# Patient Record
Sex: FEMALE | Race: WHITE | NOT HISPANIC OR LATINO
[De-identification: names, ages, dates, MRNs, and addresses within clinical notes are randomized per-mention and may not be internally consistent; named-entity substitution may affect disease eponyms.]

---

## 2020-09-29 ENCOUNTER — TRANSCRIPTION ENCOUNTER (OUTPATIENT)
Age: 30
End: 2020-09-29

## 2021-08-06 ENCOUNTER — EMERGENCY (EMERGENCY)
Facility: HOSPITAL | Age: 31
LOS: 1 days | Discharge: HOME | End: 2021-08-06
Attending: EMERGENCY MEDICINE | Admitting: EMERGENCY MEDICINE
Payer: COMMERCIAL

## 2021-08-06 VITALS
DIASTOLIC BLOOD PRESSURE: 76 MMHG | OXYGEN SATURATION: 97 % | HEART RATE: 76 BPM | HEIGHT: 64 IN | WEIGHT: 104.94 LBS | RESPIRATION RATE: 18 BRPM | SYSTOLIC BLOOD PRESSURE: 116 MMHG | TEMPERATURE: 98 F

## 2021-08-06 VITALS
SYSTOLIC BLOOD PRESSURE: 102 MMHG | DIASTOLIC BLOOD PRESSURE: 75 MMHG | RESPIRATION RATE: 16 BRPM | HEART RATE: 68 BPM | TEMPERATURE: 98 F | OXYGEN SATURATION: 98 %

## 2021-08-06 LAB
APPEARANCE UR: CLEAR — SIGNIFICANT CHANGE UP
BILIRUB UR-MCNC: NEGATIVE — SIGNIFICANT CHANGE UP
COLOR SPEC: SIGNIFICANT CHANGE UP
DIFF PNL FLD: NEGATIVE — SIGNIFICANT CHANGE UP
GLUCOSE UR QL: NEGATIVE MG/DL — SIGNIFICANT CHANGE UP
HCG UR QL: NEGATIVE — SIGNIFICANT CHANGE UP
KETONES UR-MCNC: NEGATIVE — SIGNIFICANT CHANGE UP
LEUKOCYTE ESTERASE UR-ACNC: NEGATIVE — SIGNIFICANT CHANGE UP
NITRITE UR-MCNC: NEGATIVE — SIGNIFICANT CHANGE UP
PH UR: 8 — SIGNIFICANT CHANGE UP (ref 5–8)
PROT UR-MCNC: NEGATIVE MG/DL — SIGNIFICANT CHANGE UP
SP GR SPEC: 1.01 — SIGNIFICANT CHANGE UP (ref 1.01–1.02)
UROBILINOGEN FLD QL: NEGATIVE MG/DL — SIGNIFICANT CHANGE UP

## 2021-08-06 PROCEDURE — 72100 X-RAY EXAM L-S SPINE 2/3 VWS: CPT

## 2021-08-06 PROCEDURE — 99284 EMERGENCY DEPT VISIT MOD MDM: CPT | Mod: 25

## 2021-08-06 PROCEDURE — 96375 TX/PRO/DX INJ NEW DRUG ADDON: CPT

## 2021-08-06 PROCEDURE — 96365 THER/PROPH/DIAG IV INF INIT: CPT

## 2021-08-06 PROCEDURE — 81025 URINE PREGNANCY TEST: CPT

## 2021-08-06 PROCEDURE — 99284 EMERGENCY DEPT VISIT MOD MDM: CPT

## 2021-08-06 PROCEDURE — 81003 URINALYSIS AUTO W/O SCOPE: CPT

## 2021-08-06 PROCEDURE — 72100 X-RAY EXAM L-S SPINE 2/3 VWS: CPT | Mod: 26

## 2021-08-06 RX ORDER — IBUPROFEN 200 MG
1 TABLET ORAL
Qty: 15 | Refills: 0
Start: 2021-08-06 | End: 2021-08-10

## 2021-08-06 RX ORDER — DEXAMETHASONE 0.5 MG/5ML
10 ELIXIR ORAL ONCE
Refills: 0 | Status: COMPLETED | OUTPATIENT
Start: 2021-08-06 | End: 2021-08-06

## 2021-08-06 RX ORDER — DIAZEPAM 5 MG
5 TABLET ORAL ONCE
Refills: 0 | Status: DISCONTINUED | OUTPATIENT
Start: 2021-08-06 | End: 2021-08-06

## 2021-08-06 RX ORDER — DIAZEPAM 5 MG
1 TABLET ORAL
Qty: 9 | Refills: 0
Start: 2021-08-06 | End: 2021-08-08

## 2021-08-06 RX ORDER — KETOROLAC TROMETHAMINE 30 MG/ML
30 SYRINGE (ML) INJECTION ONCE
Refills: 0 | Status: DISCONTINUED | OUTPATIENT
Start: 2021-08-06 | End: 2021-08-06

## 2021-08-06 RX ADMIN — Medication 30 MILLIGRAM(S): at 14:00

## 2021-08-06 RX ADMIN — Medication 30 MILLIGRAM(S): at 14:30

## 2021-08-06 RX ADMIN — Medication 5 MILLIGRAM(S): at 13:56

## 2021-08-06 RX ADMIN — Medication 10 MILLIGRAM(S): at 14:30

## 2021-08-06 RX ADMIN — Medication 102 MILLIGRAM(S): at 14:00

## 2021-08-06 NOTE — ED PROVIDER NOTE - OBJECTIVE STATEMENT
Pt is a 29 yo female with no pmhx c/o of left sided back pain shooting pain in buttocks down leg since this morning. Pt states pain started after she stretched denies any heavy lifting. Pt denies any numbness tingling saddle anesthesia bowel or bladder dysfunction. Pt took flexeril without relief. Pt states she has had back pain in past minor nothing compared to this.

## 2021-08-06 NOTE — ED PROVIDER NOTE - CARE PROVIDER_API CALL
Omari Back  ORTHOPAEDIC SURGERY  [address] [place] [address]  Phone: [phone]  Fax: [phone]  Follow Up Time:

## 2021-08-06 NOTE — ED PROVIDER NOTE - PATIENT PORTAL LINK FT
You can access the FollowMyHealth Patient Portal offered by [facility] by registering at the following website: [email or web address]. By joining [employer]’s FollowMyHealth portal, you will also be able to view your health information using other applications (apps) compatible with our system.

## 2021-08-06 NOTE — ED ADULT NURSE NOTE - OBJECTIVE STATEMENT
Complaining of left sided back pain radiating to left leg started this am, pt stated she went to the Gym but does not remember any injury. Pain started after reaching for something while at home.

## 2021-08-06 NOTE — ED PROVIDER NOTE - NSFOLLOWUPINSTRUCTIONS_ED_ALL_ED_FT
Follow up with orthopedics  return to er for any worsening symptoms weakness numbness tingling urinary bladder incontinence       Sciatica    WHAT YOU NEED TO KNOW:    Sciatica is a condition that causes pain along your sciatic nerve. The sciatic nerve runs from your spine through both sides of your buttocks. It then runs down the back of your thigh, into your lower leg and foot. Your sciatic nerve may be compressed, inflamed, irritated, or stretched.    DISCHARGE INSTRUCTIONS:    Medicines:   •NSAIDs: These medicines decrease swelling and pain. NSAIDs are available without a doctor's order. Ask your healthcare provider which medicine is right for you. Ask how much to take and when to take it. Take as directed. NSAIDs can cause stomach bleeding or kidney problems if not taken correctly.      •Acetaminophen: This medicine decreases pain. Acetaminophen is available without a doctor's order. Ask how much to take and when to take it. Follow directions. Acetaminophen can cause liver damage if not taken correctly.      •Muscle relaxers help decrease pain and muscle spasms.      •Take your medicine as directed. Contact your healthcare provider if you think your medicine is not helping or if you have side effects. Tell him of her if you are allergic to any medicine. Keep a list of the medicines, vitamins, and herbs you take. Include the amounts, and when and why you take them. Bring the list or the pill bottles to follow-up visits. Carry your medicine list with you in case of an emergency.      Follow up with your doctor as directed: Write down your questions so you remember to ask them during your visits.     Manage your symptoms:   •Activity: Decrease your activity. Do not lift heavy objects or twist your back for at least 6 weeks. Slowly return to your usual activity.      •Ice: Ice helps decrease swelling and pain. Ice may also help prevent tissue damage. Use an ice pack, or put crushed ice in a plastic bag. Cover it with a towel and place it on your low back or leg for 15 to 20 minutes every hour or as directed.      •Heat: Heat helps decrease pain and muscle spasms. Apply heat on the area for 20 to 30 minutes every 2 hours for as many days as directed.       •Physical therapy: You may need to see physical therapist to teach you exercises to help improve movement and strength, and to decrease pain. An occupational therapist teaches you skills to help with your daily activities.       •Use assistive devices if directed: You may need to wear back support, such as a back brace. You may need crutches, a cane, or a walker to decrease stress on your lower back and leg muscles. Ask your healthcare provider for more information about assistive devices and how to use them correctly.      Self-care:   •Avoid pressure on your back and legs: Do not lift heavy objects, or stand or sit for long periods of time.      •Lift objects safely: Keep your back straight and bend your knees when you  an object. Do not bend or twist your back when you lift.      •Maintain a healthy weight: Ask your healthcare provider how much you should weigh. Ask him to help you create a weight loss plan if you are overweight.       •Exercise: Ask your healthcare provider about the best stretching, warmup, and exercise plan for you.       Contact your healthcare provider if:   •You have pain in your lower back at night or when resting.      •You have pain in your lower back with numbness below the knee.      •You have weakness in one leg only.      •You have questions or concerns about your condition or care.      Return to the emergency department if:   •You have trouble holding back your urine or bowel movements.      •You have weakness in both legs.      •You have numbness in your groin or buttocks.

## 2021-08-06 NOTE — ED PROVIDER NOTE - ATTENDING CONTRIBUTION TO CARE
Pt is a 31 yo female who presents to the ED with a cc of left back pain with radiation down his leg. Pt with no significant past medical history. Pt reports that this morning she bent to pick something up and felt a pop in his lower back and has been experiencing left lower back/gluteal pain with radiation down her left leg since. Denies ext numbness or weakness, denies loss of bowel or bladder function. Pt has no known back issues. Reports increased pain with movement. Took flexeril at home with no improvement in pain. Denies fever, chills, N/V, CP, SOB, abd pain. Denies dysuria, hematuria. On exam pt sitting up in bed s/p medication appears mores comfortable. Is able to ambulate in the ED. NCAT, PERRL, EOMI, heart RR, lungs CTA, abd soft NT/ND. No Midline C/T/L TTP. TTP to left lower paraspinal muscle region, left gluteal region with no overlying skin changes, positive straight left raise on the left. NVI to bilateral lower ext. Pt presenting with s/s concerning for sciatica high suspicion for disc herniation. Will obtain x-ray and medicate for pain. Advised pt of need for follow up for outpatient MRI.

## 2022-04-08 ENCOUNTER — TRANSCRIPTION ENCOUNTER (OUTPATIENT)
Age: 32
End: 2022-04-08

## 2023-01-04 ENCOUNTER — NON-APPOINTMENT (OUTPATIENT)
Age: 33
End: 2023-01-04

## 2023-05-04 ENCOUNTER — APPOINTMENT (OUTPATIENT)
Dept: HUMAN REPRODUCTION | Facility: CLINIC | Age: 33
End: 2023-05-04
Payer: COMMERCIAL

## 2023-05-04 ENCOUNTER — TRANSCRIPTION ENCOUNTER (OUTPATIENT)
Age: 33
End: 2023-05-04

## 2023-05-04 PROCEDURE — 76830 TRANSVAGINAL US NON-OB: CPT

## 2023-05-04 PROCEDURE — 99205 OFFICE O/P NEW HI 60 MIN: CPT | Mod: 25

## 2023-05-04 PROCEDURE — 36415 COLL VENOUS BLD VENIPUNCTURE: CPT

## 2023-05-26 ENCOUNTER — APPOINTMENT (OUTPATIENT)
Dept: HUMAN REPRODUCTION | Facility: CLINIC | Age: 33
End: 2023-05-26
Payer: COMMERCIAL

## 2023-05-26 PROCEDURE — 74740 X-RAY FEMALE GENITAL TRACT: CPT

## 2023-05-26 PROCEDURE — 58999I: CUSTOM

## 2023-05-26 PROCEDURE — 58340 CATHETER FOR HYSTEROGRAPHY: CPT

## 2023-05-26 PROCEDURE — 99214 OFFICE O/P EST MOD 30 MIN: CPT | Mod: 25

## 2023-05-26 PROCEDURE — 76831 ECHO EXAM UTERUS: CPT

## 2023-07-06 ENCOUNTER — APPOINTMENT (OUTPATIENT)
Dept: HUMAN REPRODUCTION | Facility: CLINIC | Age: 33
End: 2023-07-06
Payer: COMMERCIAL

## 2023-07-06 PROCEDURE — 99215 OFFICE O/P EST HI 40 MIN: CPT

## 2023-08-31 ENCOUNTER — APPOINTMENT (OUTPATIENT)
Dept: ULTRASOUND IMAGING | Facility: CLINIC | Age: 33
End: 2023-08-31

## 2023-08-31 ENCOUNTER — OUTPATIENT (OUTPATIENT)
Dept: OUTPATIENT SERVICES | Facility: HOSPITAL | Age: 33
LOS: 1 days | End: 2023-08-31
Payer: COMMERCIAL

## 2023-08-31 ENCOUNTER — APPOINTMENT (OUTPATIENT)
Dept: MAMMOGRAPHY | Facility: CLINIC | Age: 33
End: 2023-08-31

## 2023-08-31 DIAGNOSIS — Z80.3 FAMILY HISTORY OF MALIGNANT NEOPLASM OF BREAST: ICD-10-CM

## 2023-08-31 PROCEDURE — 76641 ULTRASOUND BREAST COMPLETE: CPT | Mod: 26,50

## 2024-01-26 ENCOUNTER — APPOINTMENT (OUTPATIENT)
Dept: OBGYN | Facility: CLINIC | Age: 34
End: 2024-01-26
Payer: COMMERCIAL

## 2024-01-26 VITALS
BODY MASS INDEX: 18.61 KG/M2 | HEIGHT: 63 IN | WEIGHT: 105 LBS | SYSTOLIC BLOOD PRESSURE: 128 MMHG | DIASTOLIC BLOOD PRESSURE: 86 MMHG

## 2024-01-26 DIAGNOSIS — D25.9 LEIOMYOMA OF UTERUS, UNSPECIFIED: ICD-10-CM

## 2024-01-26 PROCEDURE — 99203 OFFICE O/P NEW LOW 30 MIN: CPT

## 2024-01-26 NOTE — PLAN
[FreeTextEntry1] : 34 yo, symptomatic fibroids, stable - Discussed the option of continued conservative observation of fibroids vs surgical removal. The risks of surgery include possible infertility and adhesion formation vs the risks of conservative follow-up: infertility, miscarriage and PTL were discussed at length. The possible need for primary  after myomectomy was also outlined. - book hysteroscopic myomectomy with bipolar resectoscope - rx pelvic MRI - f/u MRI results

## 2024-01-26 NOTE — CONSULT LETTER
[Dear  ___] : Dear  [unfilled], [Consult Letter:] : I had the pleasure of evaluating your patient, [unfilled]. [Please see my note below.] : Please see my note below. [Consult Closing:] : Thank you very much for allowing me to participate in the care of this patient.  If you have any questions, please do not hesitate to contact me. [Sincerely,] : Sincerely, [FreeTextEntry2] : Nika Jensen MD [address] [place] [address], [place] [address] [FreeTextEntry3] : Torsten Aguilar MD

## 2024-01-26 NOTE — END OF VISIT
[FreeTextEntry3] :    I, Nohelia Ioana, acted as a scribe on behalf of Dr. Torsten Aguilar on 01/26/2024.   All medical entries made by the scribe were at my, Dr. Torsten Aguilar's, direction and personally dictated by me on 01/26/2024. I have reviewed the chart and agree that the record accurately reflects my personal performance of the history, physical exam, assessment, and plan. I have also personally directed, reviewed, and agreed with the chart. [Time Spent: ___ minutes] : I have spent [unfilled] minutes of time on the encounter. [>50% of the face to face encounter time was spent on counseling and/or coordination of care for ___] : Greater than 50% of the face to face encounter time was spent on counseling and/or coordination of care for [unfilled]

## 2024-02-20 ENCOUNTER — APPOINTMENT (OUTPATIENT)
Dept: OTOLARYNGOLOGY | Facility: CLINIC | Age: 34
End: 2024-02-20
Payer: COMMERCIAL

## 2024-02-20 VITALS — BODY MASS INDEX: 18.61 KG/M2 | WEIGHT: 105 LBS | HEIGHT: 63 IN

## 2024-02-20 DIAGNOSIS — Z82.3 FAMILY HISTORY OF STROKE: ICD-10-CM

## 2024-02-20 DIAGNOSIS — Z86.2 PERSONAL HISTORY OF DISEASES OF THE BLOOD AND BLOOD-FORMING ORGANS AND CERTAIN DISORDERS INVOLVING THE IMMUNE MECHANISM: ICD-10-CM

## 2024-02-20 DIAGNOSIS — Z78.9 OTHER SPECIFIED HEALTH STATUS: ICD-10-CM

## 2024-02-20 DIAGNOSIS — Z80.9 FAMILY HISTORY OF MALIGNANT NEOPLASM, UNSPECIFIED: ICD-10-CM

## 2024-02-20 PROCEDURE — 99204 OFFICE O/P NEW MOD 45 MIN: CPT

## 2024-02-20 NOTE — HISTORY OF PRESENT ILLNESS
[de-identified] : REID LEAL is a 33 year old patient here for nasal obstruction and balance issues.  She has at least a 2 year history of nasal congestion and obstruction.  It is constant but worse in the winter.  She also has a watery left eye and retro-orbital/sinus pressure.    no history of seasonal allergies she gets about 1 sinus infection a year no history of nasal trauma or nasal/sinus surgery she has 2 dogs no history of reflux she does not smoke but does vape She tried FLonase and Bretahe right strips. The BR strips helped somewhat  She also has intermittent dizziness which she describes as dysequilibrium. She does not have vertigo. It is not positional . She has no tinnitus, otalgia or otorrhea. She has seen a cardiologist

## 2024-02-20 NOTE — PHYSICAL EXAM
[TextEntry] : PHYSICAL EXAM   General: The patient was alert, oriented and in no distress. Voice was clear.   Face: The patient had no facial asymmetry or mass. The skin was unremarkable.   Ears: Hearing normal to conversational voice External ears were normal without deformity. Ear canals were clear. No cerumen or inflammation Tympanic membranes were intact and normal. No perforation or effusion. mobile   Nose: External nose has a tip that is mildly deviated to the left. There was no facial tenderness.  On anterior rhinoscopy, the nasal mucosa was normal.  The anterior septum was grossly midline. There were no visualized polyps, purulence or masses. mild nasal valve collapse   Oral cavity: Oral mucosa- normal. Oral and base of tongue- clear and without mass. Gingival and buccal mucosa- moist and without lesions. Palate- the palate moved well. There was no cleft palate. There appeared to be good salivary flow.  Oral cavity/oropharynx- no pus, erythema or mass   Neck: The neck was symmetrical. The parotid and submandibular glands were normal without masses. The trachea was midline and there was no unusual crepitus. Thyroid was smooth and nontender and no masses were palpated. No masses   Lymphatics: Cervical adenopathy- none.  PROCEDURE:  FLEXIBLE LARYNGOSCOPY, NASAL ENDOSCOPY   Surgeon: Dr. Brice Indication:   chronic rhinitis, deviated nasal septum, inadequate exam on anterior rhinoscopy Anesthetic: Topical lidocaine and Afrin Procedure: The patient was placed in a sitting position.  Following application of the topical anesthetic and decongestant, exam was performed with a flexible telescope.  The scope was passed along the right nasal floor to the nasopharynx.  It was then passed into the region of the middle meatus, middle turbinate, and sphenoethmoid region.  An identical procedure was performed on the left side.  The following findings were noted:  Nasal mucosa:  mild edema Septum:S-shaped deviation curing from the left anteriorly to the right. there was a large spur encroaching on the middle meatus on the right side Right nasal cavity      Inferior turbinate:  hypertrophic      Middle turbinate: normal      Superior turbinate: normal      Inferior meatus: no pus, polyps or congestion      Middle meatus:  no pus, polyps or congestion       Superior meatus:  no pus, polyps, or congestion      Sphenoethmoidal recess: no pus, polyps or congestion  Left nasal cavity      Inferior turbinate:  hypertrophic      Middle turbinate: normal      Superior turbinate: normal      Inferior meatus: no pus, polyps or congestion      Middle meatus: no pus, polyps, or congestion      Superior meatus:  no pus, polyps, or congestion      Sphenoethmoidal recess: no pus, polyps or congestion   Nasopharynx: no masses, choanae patent, no adenoid tissue  Base of tongue and vallecula: no masses or asymmetry Posterior pharyngeal wall: no masses.  Hypopharynx: symmetrical. No masses Pyriform sinuses: no lesions or pooling of secretions Epiglottis: normal. No edema or lesions Aryepiglottic folds: normal. No lesions.  True vocal cords: clear and mobile. No lesions. Airway patent False vocal cords: normal Ventricles: no masses.  Arytenoids:  normal Interarytenoid area: no masses.normal Subglottis: normal. no masses \

## 2024-02-20 NOTE — ASSESSMENT
[FreeTextEntry1] : She has chronic rhinitis with nasal congestion and obstruction. She has a deviated nasal septum, inferior turbinate hypertrophy and nasal valve collapse. She also has left retro-orbital pressure and watery left eye.    she had dizziness/dysequilibrium. She does not hav e vertigo.  We discussed possible etiologies such as peripheral vestibulopathy, migraines, cluster headaches.  She said no cardiac source was found  Plan -findings and management options discussed with the patient. -nasal saline rinses, nasal steroid spray and Breathe right strips as needed -I am sending her for a CT scan of the sinuses  -she may consider allergy evaluation -she may benefit from nasal procedures to improve breathing -septoplasty, inferior turbinate reduction and nasal valve repair -neurology evaluation recommended for the dizziness and the retro-orbital/sinus pressure -vestibular therapy recommended -consider VNG -follow up after the CT scan -she should call and return earlier if any problems

## 2024-02-26 ENCOUNTER — OUTPATIENT (OUTPATIENT)
Dept: OUTPATIENT SERVICES | Facility: HOSPITAL | Age: 34
LOS: 1 days | End: 2024-02-26
Payer: COMMERCIAL

## 2024-02-26 ENCOUNTER — APPOINTMENT (OUTPATIENT)
Dept: MRI IMAGING | Facility: CLINIC | Age: 34
End: 2024-02-26
Payer: COMMERCIAL

## 2024-02-26 VITALS
TEMPERATURE: 97 F | SYSTOLIC BLOOD PRESSURE: 121 MMHG | OXYGEN SATURATION: 100 % | DIASTOLIC BLOOD PRESSURE: 87 MMHG | RESPIRATION RATE: 20 BRPM | HEIGHT: 63 IN | WEIGHT: 102.07 LBS | HEART RATE: 95 BPM

## 2024-02-26 DIAGNOSIS — D25.9 LEIOMYOMA OF UTERUS, UNSPECIFIED: ICD-10-CM

## 2024-02-26 DIAGNOSIS — Z01.818 ENCOUNTER FOR OTHER PREPROCEDURAL EXAMINATION: ICD-10-CM

## 2024-02-26 DIAGNOSIS — K08.409 PARTIAL LOSS OF TEETH, UNSPECIFIED CAUSE, UNSPECIFIED CLASS: Chronic | ICD-10-CM

## 2024-02-26 DIAGNOSIS — Z98.890 OTHER SPECIFIED POSTPROCEDURAL STATES: Chronic | ICD-10-CM

## 2024-02-26 DIAGNOSIS — Z00.8 ENCOUNTER FOR OTHER GENERAL EXAMINATION: ICD-10-CM

## 2024-02-26 LAB
BLD GP AB SCN SERPL QL: NEGATIVE — SIGNIFICANT CHANGE UP
HCT VFR BLD CALC: 45.7 % — HIGH (ref 34.5–45)
HGB BLD-MCNC: 14.6 G/DL — SIGNIFICANT CHANGE UP (ref 11.5–15.5)
MCHC RBC-ENTMCNC: 30.5 PG — SIGNIFICANT CHANGE UP (ref 27–34)
MCHC RBC-ENTMCNC: 31.9 GM/DL — LOW (ref 32–36)
MCV RBC AUTO: 95.4 FL — SIGNIFICANT CHANGE UP (ref 80–100)
NRBC # BLD: 0 /100 WBCS — SIGNIFICANT CHANGE UP (ref 0–0)
PLATELET # BLD AUTO: 249 K/UL — SIGNIFICANT CHANGE UP (ref 150–400)
RBC # BLD: 4.79 M/UL — SIGNIFICANT CHANGE UP (ref 3.8–5.2)
RBC # FLD: 12.9 % — SIGNIFICANT CHANGE UP (ref 10.3–14.5)
RH IG SCN BLD-IMP: NEGATIVE — SIGNIFICANT CHANGE UP
WBC # BLD: 6.51 K/UL — SIGNIFICANT CHANGE UP (ref 3.8–10.5)
WBC # FLD AUTO: 6.51 K/UL — SIGNIFICANT CHANGE UP (ref 3.8–10.5)

## 2024-02-26 PROCEDURE — 72197 MRI PELVIS W/O & W/DYE: CPT | Mod: 26

## 2024-02-26 PROCEDURE — 86850 RBC ANTIBODY SCREEN: CPT

## 2024-02-26 PROCEDURE — 86900 BLOOD TYPING SEROLOGIC ABO: CPT

## 2024-02-26 PROCEDURE — 85027 COMPLETE CBC AUTOMATED: CPT

## 2024-02-26 PROCEDURE — 86901 BLOOD TYPING SEROLOGIC RH(D): CPT

## 2024-02-26 PROCEDURE — A9585: CPT

## 2024-02-26 PROCEDURE — 72197 MRI PELVIS W/O & W/DYE: CPT

## 2024-02-26 PROCEDURE — G0463: CPT

## 2024-02-26 RX ORDER — CYCLOBENZAPRINE HYDROCHLORIDE 10 MG/1
0 TABLET, FILM COATED ORAL
Qty: 0 | Refills: 0 | DISCHARGE

## 2024-02-26 RX ORDER — GABAPENTIN 400 MG/1
600 CAPSULE ORAL ONCE
Refills: 0 | Status: DISCONTINUED | OUTPATIENT
Start: 2024-03-08 | End: 2024-03-22

## 2024-02-26 RX ORDER — SODIUM CHLORIDE 9 MG/ML
1000 INJECTION, SOLUTION INTRAVENOUS
Refills: 0 | Status: DISCONTINUED | OUTPATIENT
Start: 2024-03-08 | End: 2024-03-22

## 2024-02-26 NOTE — H&P PST ADULT - ASSESSMENT
Dasi activities:  Dasi score: Dasi activities: uses the treadmill 2-3 times a week for one hour, walking, cardio, does house chores  Dasi score: 8.79  no loose or broken teeth

## 2024-02-26 NOTE — H&P PST ADULT - HISTORY OF PRESENT ILLNESS
33 year old female presents today to [facility] for scheduled D&C non obstetrical hysteroscopic myomectomy with bipolar resectoscope for leiomyoma of uterus on 3/8/2024. Her medical history significant for bicuspid aortic valve, migraine headaches, mild iron deficiency anemia, former smoker. Patient denies any dizziness, no headaches, no abdominal pain at this time.

## 2024-02-26 NOTE — H&P PST ADULT - NSICDXPASTMEDICALHX_GEN_ALL_CORE_FT
PAST MEDICAL HISTORY:  Leiomyoma of uterus      PAST MEDICAL HISTORY:  2019 novel coronavirus disease (COVID-19)     Anemia     Deviated septum     H/O bicuspid aortic valve     History of migraine headaches     Leiomyoma of uterus

## 2024-02-26 NOTE — H&P PST ADULT - NSICDXPASTSURGICALHX_GEN_ALL_CORE_FT
PAST SURGICAL HISTORY:  No significant past surgical history      PAST SURGICAL HISTORY:  History of colonoscopy     [place] teeth removed

## 2024-02-26 NOTE — H&P PST ADULT - NSANTHOSAYNRD_GEN_A_CORE
No. VASHTI screening performed.  STOP BANG Legend: 0-2 = LOW Risk; 3-4 = INTERMEDIATE Risk; 5-8 = HIGH Risk

## 2024-02-26 NOTE — H&P PST ADULT - PROBLEM SELECTOR PLAN 1
Scheduled for D&C/ non obstetrical hysteroscopic myomectomy w/ bipolar resectoscope  preop instruction provided in writing and verbally, patient verbalized understanding and teach back   ucg on admit

## 2024-02-29 ENCOUNTER — OUTPATIENT (OUTPATIENT)
Dept: OUTPATIENT SERVICES | Facility: HOSPITAL | Age: 34
LOS: 1 days | End: 2024-02-29
Payer: COMMERCIAL

## 2024-02-29 ENCOUNTER — APPOINTMENT (OUTPATIENT)
Dept: CT IMAGING | Facility: CLINIC | Age: 34
End: 2024-02-29
Payer: COMMERCIAL

## 2024-02-29 DIAGNOSIS — J34.89 OTHER SPECIFIED DISORDERS OF NOSE AND NASAL SINUSES: ICD-10-CM

## 2024-02-29 DIAGNOSIS — J31.0 CHRONIC RHINITIS: ICD-10-CM

## 2024-02-29 DIAGNOSIS — K08.409 PARTIAL LOSS OF TEETH, UNSPECIFIED CAUSE, UNSPECIFIED CLASS: Chronic | ICD-10-CM

## 2024-02-29 DIAGNOSIS — Z98.890 OTHER SPECIFIED POSTPROCEDURAL STATES: Chronic | ICD-10-CM

## 2024-02-29 DIAGNOSIS — J34.2 DEVIATED NASAL SEPTUM: ICD-10-CM

## 2024-02-29 PROBLEM — Z86.69 PERSONAL HISTORY OF OTHER DISEASES OF THE NERVOUS SYSTEM AND SENSE ORGANS: Chronic | Status: ACTIVE | Noted: 2024-02-26

## 2024-02-29 PROBLEM — D64.9 ANEMIA, UNSPECIFIED: Chronic | Status: ACTIVE | Noted: 2024-02-26

## 2024-02-29 PROBLEM — U07.1 COVID-19: Chronic | Status: ACTIVE | Noted: 2024-02-26

## 2024-02-29 PROBLEM — D25.9 LEIOMYOMA OF UTERUS, UNSPECIFIED: Chronic | Status: ACTIVE | Noted: 2024-02-26

## 2024-02-29 PROBLEM — Z87.74 PERSONAL HISTORY OF (CORRECTED) CONGENITAL MALFORMATIONS OF HEART AND CIRCULATORY SYSTEM: Chronic | Status: ACTIVE | Noted: 2024-02-26

## 2024-02-29 PROCEDURE — 70486 CT MAXILLOFACIAL W/O DYE: CPT | Mod: 26

## 2024-02-29 PROCEDURE — 70486 CT MAXILLOFACIAL W/O DYE: CPT

## 2024-03-07 ENCOUNTER — TRANSCRIPTION ENCOUNTER (OUTPATIENT)
Age: 34
End: 2024-03-07

## 2024-03-08 ENCOUNTER — OUTPATIENT (OUTPATIENT)
Dept: OUTPATIENT SERVICES | Facility: HOSPITAL | Age: 34
LOS: 1 days | End: 2024-03-08
Payer: COMMERCIAL

## 2024-03-08 ENCOUNTER — RESULT REVIEW (OUTPATIENT)
Age: 34
End: 2024-03-08

## 2024-03-08 ENCOUNTER — APPOINTMENT (OUTPATIENT)
Dept: OBGYN | Facility: HOSPITAL | Age: 34
End: 2024-03-08

## 2024-03-08 ENCOUNTER — TRANSCRIPTION ENCOUNTER (OUTPATIENT)
Age: 34
End: 2024-03-08

## 2024-03-08 VITALS
SYSTOLIC BLOOD PRESSURE: 110 MMHG | DIASTOLIC BLOOD PRESSURE: 71 MMHG | HEART RATE: 62 BPM | RESPIRATION RATE: 16 BRPM | OXYGEN SATURATION: 100 %

## 2024-03-08 VITALS
SYSTOLIC BLOOD PRESSURE: 121 MMHG | RESPIRATION RATE: 20 BRPM | HEIGHT: 63 IN | WEIGHT: 102.07 LBS | HEART RATE: 95 BPM | TEMPERATURE: 97 F | OXYGEN SATURATION: 100 % | DIASTOLIC BLOOD PRESSURE: 87 MMHG

## 2024-03-08 VITALS
OXYGEN SATURATION: 100 % | HEART RATE: 67 BPM | DIASTOLIC BLOOD PRESSURE: 68 MMHG | HEIGHT: 63 IN | SYSTOLIC BLOOD PRESSURE: 103 MMHG | TEMPERATURE: 97 F | WEIGHT: 102.07 LBS | RESPIRATION RATE: 16 BRPM

## 2024-03-08 DIAGNOSIS — Z98.890 OTHER SPECIFIED POSTPROCEDURAL STATES: Chronic | ICD-10-CM

## 2024-03-08 DIAGNOSIS — K08.409 PARTIAL LOSS OF TEETH, UNSPECIFIED CAUSE, UNSPECIFIED CLASS: Chronic | ICD-10-CM

## 2024-03-08 DIAGNOSIS — D25.9 LEIOMYOMA OF UTERUS, UNSPECIFIED: ICD-10-CM

## 2024-03-08 LAB — RH IG SCN BLD-IMP: NEGATIVE — SIGNIFICANT CHANGE UP

## 2024-03-08 PROCEDURE — 58558 HYSTEROSCOPY BIOPSY: CPT

## 2024-03-08 PROCEDURE — 88305 TISSUE EXAM BY PATHOLOGIST: CPT | Mod: 26

## 2024-03-08 PROCEDURE — 88305 TISSUE EXAM BY PATHOLOGIST: CPT

## 2024-03-08 RX ORDER — LIDOCAINE HCL 20 MG/ML
0.2 VIAL (ML) INJECTION ONCE
Refills: 0 | Status: COMPLETED | OUTPATIENT
Start: 2024-03-08 | End: 2024-03-08

## 2024-03-08 RX ORDER — ACETAMINOPHEN 500 MG
1000 TABLET ORAL ONCE
Refills: 0 | Status: COMPLETED | OUTPATIENT
Start: 2024-03-08 | End: 2024-03-08

## 2024-03-08 RX ORDER — FENTANYL CITRATE 50 UG/ML
25 INJECTION INTRAVENOUS
Refills: 0 | Status: DISCONTINUED | OUTPATIENT
Start: 2024-03-08 | End: 2024-03-08

## 2024-03-08 RX ORDER — CEFOTETAN DISODIUM 1 G
2 VIAL (EA) INJECTION ONCE
Refills: 0 | Status: COMPLETED | OUTPATIENT
Start: 2024-03-08 | End: 2024-03-08

## 2024-03-08 RX ORDER — ONDANSETRON 8 MG/1
4 TABLET, FILM COATED ORAL ONCE
Refills: 0 | Status: DISCONTINUED | OUTPATIENT
Start: 2024-03-08 | End: 2024-03-22

## 2024-03-08 RX ORDER — ONDANSETRON 8 MG/1
4 TABLET, FILM COATED ORAL ONCE
Refills: 0 | Status: DISCONTINUED | OUTPATIENT
Start: 2024-03-08 | End: 2024-03-11

## 2024-03-08 RX ORDER — CELECOXIB 200 MG/1
400 CAPSULE ORAL ONCE
Refills: 0 | Status: COMPLETED | OUTPATIENT
Start: 2024-03-08 | End: 2024-03-08

## 2024-03-08 RX ADMIN — Medication 1000 MILLIGRAM(S): at 06:19

## 2024-03-08 RX ADMIN — CELECOXIB 400 MILLIGRAM(S): 200 CAPSULE ORAL at 06:19

## 2024-03-08 RX ADMIN — SODIUM CHLORIDE 100 MILLILITER(S): 9 INJECTION, SOLUTION INTRAVENOUS at 06:19

## 2024-03-08 NOTE — ASU DISCHARGE PLAN (ADULT/PEDIATRIC) - NURSING INSTRUCTIONS
You received Tylenol, Celebrex ( similar as  ibuprofen) in Admission today. OK to take Tylenol/Acetaminophen at / after 12:20______ for pain and every 6 hours after as needed. OK to take Motrin/Ibuprofen at / after 12:20_____ for pain and every 6 hours after as needed. Take pain medicines alternate.

## 2024-03-08 NOTE — ASU PATIENT PROFILE, ADULT - NS PRO TALK SOMEONE YN
INTERVAL HPI/OVERNIGHT EVENTS:  Patient with persistent right groin/lower abdomen/pelvic pain     Code Status: Full Code   Allergies    No Known Allergies    Intolerances    MEDICATIONS  (STANDING):  ascorbic acid 500 milliGRAM(s) Oral daily  cholecalciferol 400 Unit(s) Oral daily  lactulose Syrup 10 Gram(s) Oral every 4 hours  lidocaine   Patch 1 Patch Transdermal daily  methadone    Tablet 20 milliGRAM(s) Oral every 8 hours  mirtazapine 15 milliGRAM(s) Oral at bedtime  nystatin    Suspension 321110 Unit(s) Oral two times a day  pantoprazole    Tablet 40 milliGRAM(s) Oral before breakfast  polyethylene glycol 3350 17 Gram(s) Oral daily  venlafaxine 50 milliGRAM(s) Oral daily    MEDICATIONS  (PRN):  acetaminophen   Tablet .. 650 milliGRAM(s) Oral every 6 hours PRN Temp greater or equal to 38C (100.4F), Mild Pain (1 - 3)  HYDROmorphone   Tablet 12 milliGRAM(s) Oral every 3 hours PRN moderate and severe pain  LORazepam     Tablet 0.5 milliGRAM(s) Oral three times a day PRN Anxiety      PRESENT SYMPTOMS: [ ]Unable to obtain due to poor mentation   Source if other than patient:  [ ]Family   [ ]Team     Pain (Impact on QOL):  Acute on chronic pain to right lower abdomen/pelvic/groin pain (currently 8/10) - acutely worsened since this past Sunday - sharp - non-radiating - aggravated by movement and mildly relieved with opioids     Dyspnea:  Yes [ ] No [x ] - [ ]Mild [ ]Moderate [ ]Severe  Anxiety:    Yes [ ] No [ x] - [ ]Mild [ ]Moderate [ ]Severe  Fatigue:    Yes [x ] No [ ] - [x ]Mild [ ]Moderate [ ]Severe  Nausea:    Yes [ ] No [x ] - [ ]Mild [ ]Moderate [ ]Severe                         Loss of appetite: Yes [ ] No [x ] - [ ]Mild [ ]Moderate [ ]Severe             Constipation:  Yes [ ] No [ x] - [ ]Mild [ ]Moderate [ ]Severe  Grief: Yes [x ] No [ ]     PAIN AD Score:	  [email or web address] (Ctrl + left click to view)    Other Symptoms:  [ x]All other review of systems negative     Karnofsky Performance Score/Palliative Performance Status Version 2:   70%    http://palliative.info/resource_material/[email or web address]    PHYSICAL EXAM:  Vital Signs Last 24 Hrs  T(C): 36.7 (22 May 2019 13:42), Max: 37.2 (21 May 2019 21:49)  T(F): 98.1 (22 May 2019 13:42), Max: 98.9 (21 May 2019 21:49)  HR: 63 (22 May 2019 13:42) (63 - 97)  BP: 94/51 (22 May 2019 13:42) (94/51 - 107/55)  BP(mean): --  RR: 16 (22 May 2019 13:42) (16 - 18)  SpO2: 98% (22 May 2019 13:42) (97% - 100%) I&O's Summary       GENERAL:  [x ]Alert  [x ]Oriented x 4  [ ]Lethargic  [ ]Cachexia  [ ]Unarousable  [x ]Verbal  [ ]Non-Verbal  PULMONARY:   [x ]Clear anteriorly   CARDIOVASCULAR:    [x ]Regular [ ]Irregular [ ]Tachy  [ ]Tian [ ]Murmur [ ]Other  GASTROINTESTINAL:  [x ]Soft  [ ]Distended   [x ]+BS  [ x]Non tender [ ]Tender  [ ]PEG [ ]OGT/ NGT   Last BM:    GENITOURINARY:  [ ]Normal [ ] Incontinent   [ ]Oliguria/Anuria   [ ][place]  MUSCULOSKELETAL:   [ ]Normal   [ ]Weakness  [ ]Bed/Wheelchair bound [ ]Edema  NEUROLOGIC:   [ ]No focal deficits  [ ] Cognitive impairment  [ ] Dysphagia [ ]Dysarthria [ ] Paresis [ ]Other   SKIN:   [ ]Normal   [ ]Pressure ulcer(s)  [ ]Rash    CRITICAL CARE:  [ ] Shock Present  [ ]Septic [ ]Cardiogenic [ ]Neurologic [ ]Hypovolemic  [ ]  Vasopressors [ ]  Inotropes   [ ] Respiratory failure present  [ ] Acute  [ ] Chronic [ ] Hypoxic  [ ] Hypercarbic [ ] Other  [ ] Other organ failure     LABS:                        8.6    7.47  )-----------( 214      ( 22 May 2019 06:35 )             28.2   05-22    138  |  100  |  5<L>  ----------------------------<  99  3.7   |  25  |  0.69    Ca    9.5      22 May 2019 06:35  Phos  3.7     05-22  Mg     2.1     05-22    TPro  6.4  /  Alb  3.2<L>  /  TBili  0.4  /  DBili  x   /  AST  17  /  ALT  33  /  AlkPhos  183<H>  05-22  PT/INR - ( 22 May 2019 06:35 )   PT: 15.9 SEC;   INR: 1.42          PTT - ( 22 May 2019 06:35 )  PTT:30.5 SEC      RADIOLOGY & ADDITIONAL STUDIES:    Protein Calorie Malnutrition Present: [ ] yes [ ] no  [ ] PPSV2 < or = 30%  [ ] significant weight loss [ ] poor nutritional intake [ ] anasarca [ ] catabolic state Albumin, Serum: 3.2 g/dL (05-22-19 @ 06:35)      REFERRALS:   [ ]Chaplaincy  [ ] Hospice  [ ]Child Life  [ ]Social Work  [ ]Case management [ ]Holistic Therapy   Goals of Care Document: no

## 2024-03-08 NOTE — ASU DISCHARGE PLAN (ADULT/PEDIATRIC) - CARE PROVIDER_API CALL
Torsten Aguilar  Obstetrics and Gynecology  [address] [place] [address], [facility] [address]  [place] [address]  Phone: [phone]  Fax: [phone]  Follow Up Time:

## 2024-03-08 NOTE — BRIEF OPERATIVE NOTE - NSICDXBRIEFPREOP_GEN_ALL_CORE_FT
PRE-OP DIAGNOSIS:  Submucous myoma of uterus 08-Mar-2024 07:51:14  Torsten Aguilar  Female infertility 08-Mar-2024 07:51:21  Torsten Aguilar

## 2024-03-08 NOTE — ASU DISCHARGE PLAN (ADULT/PEDIATRIC) - NS MD DC FALL RISK RISK
For information on Fall & Injury Prevention, visit: [email or web address] OR  [email or web address] OR  https://www.cdc.gov/steadi/patient.html

## 2024-03-08 NOTE — ASU PATIENT PROFILE, ADULT - NSICDXPASTMEDICALHX_GEN_ALL_CORE_FT
PAST MEDICAL HISTORY:  2019 novel coronavirus disease (COVID-19)     Anemia     Deviated septum     H/O bicuspid aortic valve     History of migraine headaches     Leiomyoma of uterus

## 2024-03-12 ENCOUNTER — TRANSCRIPTION ENCOUNTER (OUTPATIENT)
Age: 34
End: 2024-03-12

## 2024-03-13 LAB — SURGICAL PATHOLOGY STUDY: SIGNIFICANT CHANGE UP

## 2024-03-15 ENCOUNTER — APPOINTMENT (OUTPATIENT)
Dept: OTOLARYNGOLOGY | Facility: CLINIC | Age: 34
End: 2024-03-15
Payer: COMMERCIAL

## 2024-03-15 DIAGNOSIS — R42 DIZZINESS AND GIDDINESS: ICD-10-CM

## 2024-03-15 DIAGNOSIS — J34.89 OTHER SPECIFIED DISORDERS OF NOSE AND NASAL SINUSES: ICD-10-CM

## 2024-03-15 DIAGNOSIS — J34.2 DEVIATED NASAL SEPTUM: ICD-10-CM

## 2024-03-15 DIAGNOSIS — J31.0 CHRONIC RHINITIS: ICD-10-CM

## 2024-03-15 DIAGNOSIS — R22.1 LOCALIZED SWELLING, MASS AND LUMP, HEAD: ICD-10-CM

## 2024-03-15 DIAGNOSIS — R22.0 LOCALIZED SWELLING, MASS AND LUMP, HEAD: ICD-10-CM

## 2024-03-15 DIAGNOSIS — M95.0 ACQUIRED DEFORMITY OF NOSE: ICD-10-CM

## 2024-03-15 DIAGNOSIS — J34.3 HYPERTROPHY OF NASAL TURBINATES: ICD-10-CM

## 2024-03-15 PROCEDURE — 99212 OFFICE O/P EST SF 10 MIN: CPT

## 2024-03-15 NOTE — HISTORY OF PRESENT ILLNESS
[Home] : at home, [unfilled] , at the time of the visit. [Medical Office: ([facility])___] : at the medical office located in  [de-identified] : REID LEAL is a 33 year old patient With a history of chronic nasal congestion, obstruction, and balance issues.  She also has left retro-orbital/sinus pressure and a watery left eye.  She had a CT scan of her sinuses.  It showed a deviated septum.  The sinuses were clear.  ENT history no history of seasonal allergies she gets about 1 sinus infection a year no history of nasal trauma or nasal/sinus surgery she has 2 dogs no history of reflux she does not smoke but does vape She tried FLonase and Bretahe right strips. The BR strips helped somewhat  She also has intermittent dizziness which she describes as dysequilibrium. She does not have vertigo. It is not positional . She has no tinnitus, otalgia or otorrhea. She has seen a cardiologist  On exam she has mild nasal valve collapse, a deviated septum, and inferior turbinate hypertrophy

## 2024-03-15 NOTE — ASSESSMENT
[FreeTextEntry1] : She has a history of chronic rhinitis.  She has a deviated septum, inferior turbinate perjury, and nasal valve collapse.  She has a history of left retro-orbital pressure and a watery left eye.  CT scan of the sinuses showed clear sinuses.  She has dizziness and disequilibrium.  Plan -Findings and management options were discussed with the patient. - Nasal saline irrigations, nasal steroid spray, and Breathe Right strips as needed - She could consider septoplasty, inferior turbinate reduction, and nasal valve repair. - Neurology evaluation recommended - Vestibular therapy recommended.  Consider specialized inner ear testing - I have asked her to follow-up after the neurology evaluation.  I would like to see her back earlier if she has any worsening symptoms or feels like she has a sinus infection

## 2024-03-15 NOTE — PHYSICAL EXAM
[TextEntry] : PHYSICAL EXAM- Limited exam as it was a telemedicine visit done via video  General: The patient was alert, oriented and in no distress. Voice was clear.  Face: The patient had no facial asymmetry or mass. The skin was unremarkable.  Ears: Hearing normal to conversational voice External ears were normal without deformity.  Nose:  The external nose had no significant deformity.  No obvious nasal drainage.   Neck:  The neck appeared symmetrical. no obvious bulging masses

## 2024-03-22 ENCOUNTER — APPOINTMENT (OUTPATIENT)
Dept: OBGYN | Facility: CLINIC | Age: 34
End: 2024-03-22
Payer: COMMERCIAL

## 2024-03-22 VITALS
WEIGHT: 101 LBS | DIASTOLIC BLOOD PRESSURE: 84 MMHG | HEIGHT: 63 IN | SYSTOLIC BLOOD PRESSURE: 119 MMHG | BODY MASS INDEX: 17.89 KG/M2 | HEART RATE: 90 BPM

## 2024-03-22 PROCEDURE — 99212 OFFICE O/P EST SF 10 MIN: CPT

## 2024-03-26 NOTE — END OF VISIT
[FreeTextEntry3] :    I, Nohelia Ioana, acted as a scribe on behalf of Dr. Torsten Aguilar on 03/22/2024.   All medical entries made by the scribe were at my, Dr. Torsten Aguilar's, direction and personally dictated by me on 03/22/2024. I have reviewed the chart and agree that the record accurately reflects my personal performance of the history, physical exam, assessment, and plan. I have also personally directed, reviewed, and agreed with the chart. [Time Spent: ___ minutes] : I have spent [unfilled] minutes of time on the encounter.

## 2024-03-26 NOTE — CONSULT LETTER
[Dear  ___] : Dear  [unfilled], [Please see my note below.] : Please see my note below. [Consult Letter:] : I had the pleasure of evaluating your patient, [unfilled]. [Sincerely,] : Sincerely, [Consult Closing:] : Thank you very much for allowing me to participate in the care of this patient.  If you have any questions, please do not hesitate to contact me. [FreeTextEntry2] : Nika Jensen MD [address] [place] [address], [place] [address] [FreeTextEntry3] : Torsten Aguilar MD

## 2024-03-26 NOTE — HISTORY OF PRESENT ILLNESS
[Pain is well-controlled] : pain is well-controlled [None] : no vaginal bleeding [Normal] : normal [Pathology reviewed] : pathology reviewed [Chills] : no chills [Fever] : no fever [Vomiting] : no vomiting [Nausea] : no nausea [de-identified] : 3/8/24 [de-identified] : infertility [de-identified] : diagnostic hysteroscopy, D+C [de-identified] : 34 yo s/p diagnostic hysteroscopy, D+C 3/8/24, discharged POD 0, no complications or complaints  Operative Findings:  Normal tubal ostia bilaterally.  Normal cavity.  No fibroid noted in the lower uterine segment.  Pathology: Final Diagnosis Endometrial curettings - Benign endometrium with focal secretory features. - Benign endocervical tissue.  [de-identified] : abd soft, nt, nd

## 2024-03-26 NOTE — PLAN
[FreeTextEntry1] : 34 yo s/p diagnostic hysteroscopy, D+C 3/8/24, stable, path benign - pathology reviewed - routine post-op care - pt to f/u with [facility] (male factor infertility)

## 2024-05-08 ENCOUNTER — APPOINTMENT (OUTPATIENT)
Dept: OBGYN | Facility: CLINIC | Age: 34
End: 2024-05-08
Payer: COMMERCIAL

## 2024-05-08 VITALS
WEIGHT: 104 LBS | DIASTOLIC BLOOD PRESSURE: 82 MMHG | HEIGHT: 64 IN | SYSTOLIC BLOOD PRESSURE: 120 MMHG | BODY MASS INDEX: 17.75 KG/M2

## 2024-05-08 DIAGNOSIS — Z00.00 ENCOUNTER FOR GENERAL ADULT MEDICAL EXAMINATION W/OUT ABNORMAL FINDINGS: ICD-10-CM

## 2024-05-08 DIAGNOSIS — Z11.51 ENCOUNTER FOR SCREENING FOR HUMAN PAPILLOMAVIRUS (HPV): ICD-10-CM

## 2024-05-08 DIAGNOSIS — Z01.419 ENCOUNTER FOR GYNECOLOGICAL EXAMINATION (GENERAL) (ROUTINE) W/OUT ABNORMAL FINDINGS: ICD-10-CM

## 2024-05-08 PROCEDURE — 99395 PREV VISIT EST AGE 18-39: CPT

## 2024-05-09 LAB — HPV HIGH+LOW RISK DNA PNL CVX: NOT DETECTED

## 2024-05-10 PROBLEM — Z11.51 ENCOUNTER FOR SCREENING FOR HUMAN PAPILLOMAVIRUS (HPV): Status: ACTIVE | Noted: 2024-05-08

## 2024-05-10 PROBLEM — Z01.419 ENCOUNTER FOR CERVICAL PAP SMEAR WITH PELVIC EXAM: Status: ACTIVE | Noted: 2024-05-08

## 2024-05-10 NOTE — END OF VISIT
[FreeTextEntry3] :    I, Nohelia Ioana, acted as a scribe on behalf of Dr. Torsten Aguilar on 05/08/2024.   All medical entries made by the scribe were at my, Dr. Torsten Aguilar's, direction and personally dictated by me on 05/08/2024. I have reviewed the chart and agree that the record accurately reflects my personal performance of the history, physical exam, assessment, and plan. I have also personally directed, reviewed, and agreed with the chart.

## 2024-05-10 NOTE — PLAN
[FreeTextEntry1] : 34 yo, annual visit, stable  HCM - pap done today - vit D/exercise/calcium - rx baseline breast mammo/sono - colon screening age 44 - f/u PCP for annual and appropriate immunizations - rto 1 year  male factor infertility - follows with [facility]  heavier menses - continue to monitor - menstrual calendar discussed

## 2024-05-10 NOTE — HISTORY OF PRESENT ILLNESS
[FreeTextEntry1] : 34 yo G0 LMP 4/30/24 presents for annual visit. She is doing well and has no complaints. Pt s/p D+C, dx hysteroscopy 3/8/24. Pt reports heavier menses since surgery.   OBH: G0 GYNH: hx of fibroids, abnormal pap/HPV, normal since PMH: anemia, UCTD PSH: none FH: HTN, stroke (mother), breast cancer (MGM) Medications: PNVs Allergies: NKDA Social: previously smoked cigarettes for 10 years, quit in 2018, currently vapes

## 2024-05-13 LAB — CYTOLOGY CVX/VAG DOC THIN PREP: NORMAL

## (undated) DEVICE — GLV 7.5 PROTEXIS (WHITE)

## (undated) DEVICE — POSITIONER PATIENT SAFETY STRAP 3X60"

## (undated) DEVICE — WARMING BLANKET UPPER ADULT

## (undated) DEVICE — AVETA CORAL HYSTEROSCOPE 4.6MM DISP

## (undated) DEVICE — AVETA FLUID MANAGEMENT ACCESSORY W CAP

## (undated) DEVICE — SOL IRR POUR NS 0.9% 500ML

## (undated) DEVICE — AVETA FLUID MANAGEMENT ACCESSORY

## (undated) DEVICE — DRAPE LIGHT HANDLE COVER (GREEN)

## (undated) DEVICE — SOL IRR GLYCINE 1.5% 3000L

## (undated) DEVICE — PACK LITHOTOMY